# Patient Record
Sex: MALE | Race: WHITE | NOT HISPANIC OR LATINO | Employment: OTHER | URBAN - METROPOLITAN AREA
[De-identification: names, ages, dates, MRNs, and addresses within clinical notes are randomized per-mention and may not be internally consistent; named-entity substitution may affect disease eponyms.]

---

## 2022-01-21 ENCOUNTER — OFFICE VISIT (OUTPATIENT)
Dept: NEUROLOGY | Facility: MEDICAL CENTER | Age: 62
End: 2022-01-21
Attending: PSYCHIATRY & NEUROLOGY
Payer: MEDICAID

## 2022-01-21 VITALS
WEIGHT: 98.33 LBS | RESPIRATION RATE: 12 BRPM | TEMPERATURE: 98.1 F | SYSTOLIC BLOOD PRESSURE: 142 MMHG | DIASTOLIC BLOOD PRESSURE: 90 MMHG | HEIGHT: 62 IN | HEART RATE: 54 BPM | BODY MASS INDEX: 18.09 KG/M2 | OXYGEN SATURATION: 100 %

## 2022-01-21 DIAGNOSIS — R56.9 SEIZURE (HCC): Primary | ICD-10-CM

## 2022-01-21 PROCEDURE — 99205 OFFICE O/P NEW HI 60 MIN: CPT | Performed by: PSYCHIATRY & NEUROLOGY

## 2022-01-21 PROCEDURE — 99202 OFFICE O/P NEW SF 15 MIN: CPT | Performed by: PSYCHIATRY & NEUROLOGY

## 2022-01-21 RX ORDER — PREGABALIN 300 MG/1
CAPSULE ORAL
COMMUNITY
Start: 2022-01-04 | End: 2022-01-21 | Stop reason: SDUPTHER

## 2022-01-21 RX ORDER — PREGABALIN 300 MG/1
300 CAPSULE ORAL 2 TIMES DAILY
Qty: 60 CAPSULE | Refills: 5 | Status: SHIPPED | OUTPATIENT
Start: 2022-01-21 | End: 2022-03-18 | Stop reason: SDUPTHER

## 2022-01-21 RX ORDER — BUPRENORPHINE HYDROCHLORIDE, NALOXONE HYDROCHLORIDE 2; .5 MG/1; MG/1
FILM, SOLUBLE BUCCAL; SUBLINGUAL
COMMUNITY
Start: 2022-01-04 | End: 2022-10-06

## 2022-01-21 ASSESSMENT — ENCOUNTER SYMPTOMS
FALLS: 0
SEIZURES: 1
MEMORY LOSS: 0
LOSS OF CONSCIOUSNESS: 1
SENSORY CHANGE: 0
FOCAL WEAKNESS: 1

## 2022-01-21 ASSESSMENT — PATIENT HEALTH QUESTIONNAIRE - PHQ9: CLINICAL INTERPRETATION OF PHQ2 SCORE: 0

## 2022-01-22 NOTE — PROGRESS NOTES
"Aury Mckoy is a 61 y.o. male who presents from the office of Dr. Niraj Quintero DO, for consultation, with a history of seizures, also status post  shunt placement for arachnoid cyst.  He presents with his significant other, Myranda, who provides additional history and color.    DIMITRIOS Banuelos is a pleasant, though unfortunate, 61-year-old right-handed gentleman whose first seizure occurred he estimates in about 2008.  At the time he was incarcerated in the state of [place].  He remembers being in long term out on the floor, the next thing he remembers is awakening on the floor.  He evidently had been incontinent, awoke with a severe headache, was very tired and confused.  He evidently was shipped off to the [facility] but nothing specific was done for him.    Still incarcerated, about 2 years later, he had a recurrence.  At that point he was hospitalized, he evidently had some type of \"tumor\" and he was placed in a \"medically induced coma\" for 8 weeks as the tumor was \"treated\".  Following treatment, he was again placed back in federal custody, though he was sent off to [facility] \"in a wheelchair with a walker\" and told that he had to learn to walk again.  Still, he was not placed on any type of seizure medication.    His third seizure occurred 3 months after he was back in [facility].  Again there was no premonitory sign, simply what sounds like was a generalized seizure, he awoke with a typical headache, post ictal confusion, tiredness, etc.  He was finally released from custody in 2012, but the seizure events continued.    His seizures evidently were witnessed by Myranda on a more consistent pattern.  They would begin with slurred speech, he would then lose consciousness, fall to the ground if he is standing.  She describes generalized shaking and unresponsiveness, his eyes are closed.  Intermittently he can be incontinent, but he always awakens very confused, tired and complaining of headache and muscle soreness.  " "It usually takes about 24 hours with good rest, but he recovers back to baseline.  There was never a pattern to occurrence, but he was having them, they estimate, upwards of 7 times in a week.    During this time, he had continue to use THC, history of heroin and methamphetamine having stopped in the late 1990s.  He was using daily, though they felt the seizures were happening less often, he could go without 1 for maybe 1-2 months.    Clinically, since 2010 with the \"tumor\" identified, and his discharge, he walks in very unsteady fashion.  He has a real tendency to fall as a result.  He feels the legs are incoordinated, also weakened.  He denies any sensory distortions.  There is some incoordination with the upper extremities, though to a lesser degree, right slightly more involved than the left.    He finally saw Dr. Quintero, and about 4 months ago Lyrica was started, titrating slowly, he has been on a 300 mg twice daily regimen for the last several months, his last seizure occurred 3 weeks ago.  He states he is tolerating the Lyrica without issue, the drug was used simply because it can help in patients with fibromyalgia, another diagnosis he carries.    They cannot recall the last time a CT scan of the brain was obtained or an EEG being done.    Other than his seizures, he has a history of osteogenesis imperfecta, fibromyalgia, but no history of liver or kidney disease, MS, migraine, CVA, PAD, CAD, malignancy, thyroid disease, autoimmune disease, pulmonary disease, or blood dyscrasia.    He underwent  shunt placement a couple of years after he left custody in March, 2012, though they are not specific, they remember being told that he had an arachnoid cyst.    Known in the family has a history of seizures.    Social history as above, he also smokes regularly and uses cannabis regularly.  He does not drink.  He is presently on Lyrica 300 mg, twice daily, and Suboxone.    Review of Systems   Musculoskeletal: " "Negative for falls.   Neurological: Positive for focal weakness, seizures and loss of consciousness. Negative for sensory change.   Psychiatric/Behavioral: Negative for memory loss.   All other systems reviewed and are negative.    Objective     /90 (BP Location: Left arm, Patient Position: Sitting, BP Cuff Size: Adult)   Pulse (!) 54   Temp 36.7 °C (98.1 °F) (Temporal)   Resp 12   Ht 1.575 m (5' 2\")   Wt 44.6 kg (98 lb 5.2 oz)   SpO2 100%   BMI 17.98 kg/m²      Physical Exam    He appears in no acute distress, he is cooperative.  He appears older than his stated age.  He is a dentulous.  There is no malar rash.  His neck is supple.  Cardiac evaluation is unremarkable.    Neurological Exam    His mental processing speed is slowed, but he is fully oriented, there is no aphasia, agnosia, apraxia, or inattention.    PERRLA/EOMI.  Visual fields are full to finger counting on confrontation bilaterally.  He is myopic, funduscopic exam could not be performed.  There is a slight dysarthria to his speech, not easily characterized.  There is no bradykinesia or hypophonia.  Facial movements are symmetric, the tongue and uvula are midline.  Sensory exam is intact to light touch and temperature.  Shoulder shrug and head rotation are intact.    Musculoskeletal exam reveals normal tone throughout.  There is no tremor or drift.  Strength in the upper extremities is intact.  In the lower extremities there is some weakness of the hip stabilizers, and some weakness distally in the left lower extremity on dorsiflexion and plantar flexion.    Reflexes in the upper extremities are very brisk, there are no clear asymmetries.  In the lower extremities they are relatively diminished bilaterally, but present throughout except the left ankle jerk which seems to be absent.  Both toes are downgoing.    He is very unsteady as he walks, station is widened, he looks at the ground to maintain balance.  There is a clear apractic " quality.  There is incoordination with both lower extremities out of proportion to the degree of weakness.  Repetitive movements with the feet are slowed, left more than right.  In the upper extremities there is slight dystaxia with the right upper extremity, the left is intact.  Movements are slower with the right arm than left, repetitive movements are slowed bilaterally in the hands and fingers.    Sensory exam is grossly intact to vibration and temperature in the upper extremities, there is a slightly diminished perception of temperature and vibration in the left leg.    Assessment & Plan     1. Seizure (HCC)  For now, we will continue the Lyrica 300 mg, twice daily regimen, it does seem to be helping him, though the degree of control still remains to be seen.  They will keep track of his events moving forwards, and depending on need, we may need to add another antiepileptic since he is already on a pretty good dose of this drug.  All of this presumes that we are seeing true epilepsy and not a combination of epileptic and nonepileptic spells.  He certainly has risk for the former. EEG will be obtained, CT scan of the brain with and without contrast.  Need to get a better idea of just what is going on intracranially, his examination is consistent with a posterior fossa lesion, whether or not this is sequelae of a tumor resection, the presence of a concurrent arachnoid cyst, or either alone, remains to be seen.    Face-to-face time was spent with both of them talking about my impressions as well as recommendations for work-up and subsequent treatment.  We will follow-up in the next month or so, timing the EEG and imaging studies to be done on the same day as my follow-up visit to save them a long trip from where they live in [place].  They know to contact the office if he were to have a sudden cluster of seizures.      - pregabalin (LYRICA) 300 MG capsule; Take 1 Capsule by mouth 2 times a day for 181 days.   Dispense: 60 Capsule; Refill: 5  - Referral to Neurodiagnostics (EEG,EP,EMG/NCS/DBS)  - CT-HEAD WITH & W/O; Future    Time: 60 minutes in total spent on patient care including precharting, record review, discussions with healthcare staff and documentation.  This includes face-to-face time with the patient for exam, review, discussion, as well as counseling and coordinating care.

## 2022-03-11 ENCOUNTER — NON-PROVIDER VISIT (OUTPATIENT)
Dept: NEUROLOGY | Facility: MEDICAL CENTER | Age: 62
End: 2022-03-11
Attending: PSYCHIATRY & NEUROLOGY
Payer: MEDICAID

## 2022-03-11 ENCOUNTER — HOSPITAL ENCOUNTER (OUTPATIENT)
Dept: RADIOLOGY | Facility: MEDICAL CENTER | Age: 62
End: 2022-03-11
Attending: PSYCHIATRY & NEUROLOGY
Payer: MEDICAID

## 2022-03-11 DIAGNOSIS — R56.9 SEIZURE (HCC): ICD-10-CM

## 2022-03-11 PROCEDURE — 700117 HCHG RX CONTRAST REV CODE 255: Performed by: PSYCHIATRY & NEUROLOGY

## 2022-03-11 PROCEDURE — 95819 EEG AWAKE AND ASLEEP: CPT | Performed by: PSYCHIATRY & NEUROLOGY

## 2022-03-11 PROCEDURE — 70470 CT HEAD/BRAIN W/O & W/DYE: CPT

## 2022-03-11 RX ADMIN — IOHEXOL 50 ML: 350 INJECTION, SOLUTION INTRAVENOUS at 14:58

## 2022-03-11 NOTE — PROCEDURES
ROUTINE ELECTROENCEPHALOGRAM REPORT      Referring provider: Dr. Niraj Quintero DO    DOS: March 11, 2022 of 30-minute duration    INDICATION:  Vin Mckoy 61 y.o. male presenting with a history of presumed symptomatic seizures related to  shunt placement, EEG now requested as a baseline study to rule out continuing seizure activity.  Imaging of the brain with CT scan revealed no evidence of previous shunt or significant mass.    CURRENT ANTIEPILEPTIC REGIMEN: Vimpat 300 mg, twice daily    TECHNIQUE: 30 channel routine electroencephalogram (EEG) was performed in accordance with the international 10-20 system. The study was reviewed in bipolar and referential montages. The recording examined the patient during wakeful and drowsy/sleep state(s).     DESCRIPTION OF THE RECORD:  During the wakefulness, the background showed a symmetrical 9-10 hz alpha activity posteriorly with amplitude of ~70 mV.  There was reactivity to eye closure/opening.  A normal anterior-posterior gradient was noted with faster beta frequencies seen anteriorly.  During drowsiness, theta/delta frequencies were seen.    During the sleep state, background shows diffuse high-amplitude 4-5 Hz delta activity.  Symmetrical high-amplitude sleep spindles and vertex sharps were seen in the leads over the central regions.     ACTIVATION PROCEDURES:   Hyperventilation was not performed.    Intermittent Photic stimulation was performed in a stepwise fashion from 1 to 30 Hz and elicited only a slight driving response posteriorly.    ICTAL AND/OR INTERICTAL FINDINGS:   Throughout the tracing, a pattern of irregular slowing can be seen, at times rhythmic and sharply contoured over the left temporal and frontal regions, and occasional phase reversal is seen at the F7 electrode.  Intermittently this occurs over the right temporal lobe as well, at times synchronously.  The slowing can last upwards of 10 seconds.  There is no suppression that follows.  A  pattern of bilateral, more generalized slowing is also seen.  No clear paroxysmal activity is seen.    EKG: sampling of the EKG recording demonstrated sinus rhythm.     INTERPRETATION:  This is an abnormal routine EEG recording in the awake and mostly drowsy/sleep state(s).  Findings are consistent with bilateral, left greater than right sided, cerebral dysfunction with focality of the left frontal and anterior temporal regions.  The underlying abnormality suggests susceptibility to seizures though no clear seizure activity is seen.    Note: A normal EEG does not rule out epilepsy.  If the clinical suspicion remains high for seizures, a prolonged recording to capture clinical or subclinical events may be helpful.        Yousuf Hernández M.D.

## 2022-03-18 ENCOUNTER — OFFICE VISIT (OUTPATIENT)
Dept: NEUROLOGY | Facility: MEDICAL CENTER | Age: 62
End: 2022-03-18
Attending: PSYCHIATRY & NEUROLOGY
Payer: MEDICAID

## 2022-03-18 VITALS
HEIGHT: 60 IN | DIASTOLIC BLOOD PRESSURE: 68 MMHG | SYSTOLIC BLOOD PRESSURE: 124 MMHG | WEIGHT: 96.34 LBS | HEART RATE: 58 BPM | BODY MASS INDEX: 18.91 KG/M2 | OXYGEN SATURATION: 100 % | TEMPERATURE: 97.4 F

## 2022-03-18 DIAGNOSIS — R56.9 SEIZURE (HCC): Primary | ICD-10-CM

## 2022-03-18 PROCEDURE — 99211 OFF/OP EST MAY X REQ PHY/QHP: CPT | Performed by: PSYCHIATRY & NEUROLOGY

## 2022-03-18 PROCEDURE — 99213 OFFICE O/P EST LOW 20 MIN: CPT | Performed by: PSYCHIATRY & NEUROLOGY

## 2022-03-18 RX ORDER — LIDOCAINE 36 MG/1
PATCH TOPICAL
COMMUNITY
Start: 2022-03-06 | End: 2022-10-06

## 2022-03-18 RX ORDER — PREGABALIN 300 MG/1
300 CAPSULE ORAL 2 TIMES DAILY
Qty: 180 CAPSULE | Refills: 3 | Status: SHIPPED | OUTPATIENT
Start: 2022-03-18 | End: 2023-03-13

## 2022-03-18 ASSESSMENT — ENCOUNTER SYMPTOMS
SEIZURES: 0
LOSS OF CONSCIOUSNESS: 0
MEMORY LOSS: 0

## 2022-03-18 NOTE — PROGRESS NOTES
Subjective     Vin Mckoy is a 61 y.o. male who presents for follow-up, with a history of presumed symptomatic seizures related to  shunt placement for some type of posterior fossa cyst, now for follow-up after CT scan of the brain with and without contrast and EEG study.    HPI    Since last seen, and he states that he has had no seizures.  He has been compliant with Lyrica 300 mg, twice daily.  Though his EEG study did not show continuous nonconvulsive seizure activity, clearly showed rhythmic and occasional paroxysmal dysfunction over both temporal lobes, especially the left.  CT scan of the brain was remarkably completely normal.  There was no evidence of any type of posterior fossa lesion, no evidence of shunt catheter.    Medical, surgical and family histories are reviewed, there are no new drug allergies.  The balance difficulties he has had after his surgery remains unchanged.  He is on Lyrica 300 mg, twice daily, and Suboxone.    Review of Systems   Neurological: Negative for seizures and loss of consciousness.   Psychiatric/Behavioral: Negative for memory loss.   All other systems reviewed and are negative.    Objective     /68 (BP Location: Right arm, Patient Position: Sitting, BP Cuff Size: Adult)   Pulse (!) 58   Temp 36.3 °C (97.4 °F) (Temporal)   Ht 1.524 m (5')   Wt 43.7 kg (96 lb 5.5 oz)   SpO2 100%   BMI 18.82 kg/m²      Physical Exam    He appears no acute distress.  Vital signs are stable.  There is no malar rash.  His neck is supple.  Cardiac evaluation is unremarkable.     Neurological Exam    Fully oriented, there is no aphasia or inattention.  PERRLA/EOMI.  Visual fields are grossly full.  Facial movements are symmetric, sensory exam is intact to light touch.  There is slight dysarthria.  Shoulder shrug and head rotation are normal.    Musculoskeletal exam reveals no obvious weakness.  Strength is intact in all 4 extremities.    Postural instability with widened station  and axial instability are unchanged.  There is no appendicular dystaxia.  Repetitive movements are slowed but symmetric.    Assessment & Plan     1. Seizure (HCC)  We will continue Lyrica 300 mg, twice daily.  He tolerates the drug well, he was told his EEG shows susceptibility for seizures, suggesting he will likely need medication for quite a while, if not lifelong.  We will follow-up in 1 year.    - pregabalin (LYRICA) 300 MG capsule; Take 1 Capsule by mouth 2 times a day for 360 days.  Dispense: 180 Capsule; Refill: 3    Time: 20 minutes in total spent on patient care including precharting, record review, discussions with healthcare staff and documentation.  This includes face-to-face time with the patient for exam, review, discussion, as well as counseling and coordinating care.

## 2022-05-26 ENCOUNTER — TELEPHONE (OUTPATIENT)
Dept: NEUROLOGY | Facility: MEDICAL CENTER | Age: 62
End: 2022-05-26
Payer: MEDICAID

## 2022-05-26 DIAGNOSIS — R56.9 SEIZURE (HCC): ICD-10-CM

## 2022-05-27 NOTE — TELEPHONE ENCOUNTER
----- Message from Lyudmila Reza, [facility] sent at 5/25/2022  1:18 PM PDT -----  Regarding: Seizures  Good afternoon,  This Pts wife Myranda called saying he is having a lot of seizures still and asks if they should increase the Lyrica? Or what they should do.  Lyudmila Reza, MA

## 2022-06-01 ENCOUNTER — APPOINTMENT (OUTPATIENT)
Dept: NEUROLOGY | Facility: MEDICAL CENTER | Age: 62
End: 2022-06-01
Attending: REGISTERED NURSE
Payer: MEDICAID

## 2022-09-30 ENCOUNTER — TELEPHONE (OUTPATIENT)
Dept: NEUROLOGY | Facility: MEDICAL CENTER | Age: 62
End: 2022-09-30
Payer: MEDICAID

## 2022-09-30 NOTE — TELEPHONE ENCOUNTER
Phone Number Called: [phone]    Call outcome:  Was not able to leave message and received error code not able to take your call at this time and was not able to leave a message on phone number provided    Message: Reason I called was to verify that  patient received and  his medication yesterday at the pharmacy. Was advise by pharmacy medication was picked up on 09/29/2022.    MORRIS Gonzales

## 2022-10-06 ENCOUNTER — HOSPITAL ENCOUNTER (INPATIENT)
Facility: MEDICAL CENTER | Age: 62
LOS: 2 days | DRG: 083 | End: 2022-10-08
Attending: EMERGENCY MEDICINE | Admitting: SURGERY
Payer: MEDICAID

## 2022-10-06 ENCOUNTER — HOSPITAL ENCOUNTER (OUTPATIENT)
Dept: RADIOLOGY | Facility: MEDICAL CENTER | Age: 62
End: 2022-10-06

## 2022-10-06 ENCOUNTER — APPOINTMENT (OUTPATIENT)
Dept: RADIOLOGY | Facility: MEDICAL CENTER | Age: 62
DRG: 083 | End: 2022-10-06
Attending: EMERGENCY MEDICINE
Payer: MEDICAID

## 2022-10-06 ENCOUNTER — APPOINTMENT (OUTPATIENT)
Dept: RADIOLOGY | Facility: MEDICAL CENTER | Age: 62
DRG: 083 | End: 2022-10-06
Payer: MEDICAID

## 2022-10-06 DIAGNOSIS — S06.5XAA SUBDURAL HEMATOMA (HCC): ICD-10-CM

## 2022-10-06 PROBLEM — T14.90XA TRAUMA: Status: ACTIVE | Noted: 2022-10-06

## 2022-10-06 PROBLEM — Z53.09 CONTRAINDICATION TO DEEP VEIN THROMBOSIS (DVT) PROPHYLAXIS: Status: ACTIVE | Noted: 2022-10-06

## 2022-10-06 PROBLEM — Z87.898 HISTORY OF SEIZURES: Status: ACTIVE | Noted: 2022-10-06

## 2022-10-06 LAB
ABO GROUP BLD: NORMAL
ALBUMIN SERPL BCP-MCNC: 3.5 G/DL (ref 3.2–4.9)
ALBUMIN/GLOB SERPL: 1.1 G/DL
ALP SERPL-CCNC: 58 U/L (ref 30–99)
ALT SERPL-CCNC: 16 U/L (ref 2–50)
ANION GAP SERPL CALC-SCNC: 9 MMOL/L (ref 7–16)
APTT PPP: 35.5 SEC (ref 24.7–36)
AST SERPL-CCNC: 18 U/L (ref 12–45)
BILIRUB SERPL-MCNC: 0.4 MG/DL (ref 0.1–1.5)
BLD GP AB SCN SERPL QL: NORMAL
BUN SERPL-MCNC: 11 MG/DL (ref 8–22)
CALCIUM SERPL-MCNC: 8.8 MG/DL (ref 8.5–10.5)
CFT BLD TEG: 7.9 MIN (ref 4.6–9.1)
CFT P HPASE BLD TEG: 7.8 MIN (ref 4.3–8.3)
CHLORIDE SERPL-SCNC: 107 MMOL/L (ref 96–112)
CLOT ANGLE BLD TEG: 64.5 DEGREES (ref 63–78)
CLOT LYSIS 30M P MA LENFR BLD TEG: 0.1 % (ref 0–2.6)
CO2 SERPL-SCNC: 25 MMOL/L (ref 20–33)
CREAT SERPL-MCNC: 0.51 MG/DL (ref 0.5–1.4)
CT.EXTRINSIC BLD ROTEM: 1.9 MIN (ref 0.8–2.1)
ERYTHROCYTE [DISTWIDTH] IN BLOOD BY AUTOMATED COUNT: 48.3 FL (ref 35.9–50)
ETHANOL BLD-MCNC: <10.1 MG/DL
GFR SERPLBLD CREATININE-BSD FMLA CKD-EPI: 116 ML/MIN/1.73 M 2
GLOBULIN SER CALC-MCNC: 3.3 G/DL (ref 1.9–3.5)
GLUCOSE SERPL-MCNC: 99 MG/DL (ref 65–99)
HCT VFR BLD AUTO: 41.4 % (ref 42–52)
HGB BLD-MCNC: 13.9 G/DL (ref 14–18)
INR PPP: 1.13 (ref 0.87–1.13)
MCF BLD TEG: 58.8 MM (ref 52–69)
MCF.PLATELET INHIB BLD ROTEM: 21.2 MM (ref 15–32)
MCH RBC QN AUTO: 30.6 PG (ref 27–33)
MCHC RBC AUTO-ENTMCNC: 33.6 G/DL (ref 33.7–35.3)
MCV RBC AUTO: 91.2 FL (ref 81.4–97.8)
PA AA BLD-ACNC: 84.9 % (ref 0–11)
PA ADP BLD-ACNC: 54.6 % (ref 0–17)
PLATELET # BLD AUTO: 246 K/UL (ref 164–446)
PMV BLD AUTO: 10.8 FL (ref 9–12.9)
POTASSIUM SERPL-SCNC: 3.5 MMOL/L (ref 3.6–5.5)
PROT SERPL-MCNC: 6.8 G/DL (ref 6–8.2)
PROTHROMBIN TIME: 14.4 SEC (ref 12–14.6)
RBC # BLD AUTO: 4.54 M/UL (ref 4.7–6.1)
RH BLD: NORMAL
SODIUM SERPL-SCNC: 141 MMOL/L (ref 135–145)
TEG ALGORITHM TGALG: ABNORMAL
WBC # BLD AUTO: 9.4 K/UL (ref 4.8–10.8)

## 2022-10-06 PROCEDURE — 85347 COAGULATION TIME ACTIVATED: CPT

## 2022-10-06 PROCEDURE — 85384 FIBRINOGEN ACTIVITY: CPT

## 2022-10-06 PROCEDURE — 86850 RBC ANTIBODY SCREEN: CPT

## 2022-10-06 PROCEDURE — 99291 CRITICAL CARE FIRST HOUR: CPT | Performed by: SURGERY

## 2022-10-06 PROCEDURE — 86901 BLOOD TYPING SEROLOGIC RH(D): CPT

## 2022-10-06 PROCEDURE — 80053 COMPREHEN METABOLIC PANEL: CPT

## 2022-10-06 PROCEDURE — 86900 BLOOD TYPING SEROLOGIC ABO: CPT

## 2022-10-06 PROCEDURE — 85610 PROTHROMBIN TIME: CPT

## 2022-10-06 PROCEDURE — 99291 CRITICAL CARE FIRST HOUR: CPT

## 2022-10-06 PROCEDURE — 85576 BLOOD PLATELET AGGREGATION: CPT | Mod: 91

## 2022-10-06 PROCEDURE — 85730 THROMBOPLASTIN TIME PARTIAL: CPT

## 2022-10-06 PROCEDURE — 85027 COMPLETE CBC AUTOMATED: CPT

## 2022-10-06 PROCEDURE — 770022 HCHG ROOM/CARE - ICU (200)

## 2022-10-06 PROCEDURE — 36415 COLL VENOUS BLD VENIPUNCTURE: CPT

## 2022-10-06 PROCEDURE — 82077 ASSAY SPEC XCP UR&BREATH IA: CPT

## 2022-10-06 PROCEDURE — G0390 TRAUMA RESPONS W/HOSP CRITI: HCPCS

## 2022-10-06 RX ORDER — SULFAMETHOXAZOLE AND TRIMETHOPRIM 800; 160 MG/1; MG/1
TABLET ORAL
Status: ON HOLD | COMMUNITY
End: 2022-10-07

## 2022-10-06 RX ORDER — AMOXICILLIN 250 MG
1 CAPSULE ORAL
Status: DISCONTINUED | OUTPATIENT
Start: 2022-10-06 | End: 2022-10-08 | Stop reason: HOSPADM

## 2022-10-06 RX ORDER — ONDANSETRON 2 MG/ML
4 INJECTION INTRAMUSCULAR; INTRAVENOUS EVERY 4 HOURS PRN
Status: DISCONTINUED | OUTPATIENT
Start: 2022-10-06 | End: 2022-10-08 | Stop reason: HOSPADM

## 2022-10-06 RX ORDER — SODIUM CHLORIDE 9 MG/ML
INJECTION, SOLUTION INTRAVENOUS CONTINUOUS
Status: DISCONTINUED | OUTPATIENT
Start: 2022-10-07 | End: 2022-10-07

## 2022-10-06 RX ORDER — ACETAMINOPHEN 325 MG/1
650 TABLET ORAL EVERY 6 HOURS
Status: DISCONTINUED | OUTPATIENT
Start: 2022-10-07 | End: 2022-10-08 | Stop reason: HOSPADM

## 2022-10-06 RX ORDER — BISACODYL 10 MG
10 SUPPOSITORY, RECTAL RECTAL
Status: DISCONTINUED | OUTPATIENT
Start: 2022-10-06 | End: 2022-10-08 | Stop reason: HOSPADM

## 2022-10-06 RX ORDER — ONDANSETRON 4 MG/1
4 TABLET, ORALLY DISINTEGRATING ORAL EVERY 4 HOURS PRN
Status: DISCONTINUED | OUTPATIENT
Start: 2022-10-06 | End: 2022-10-08 | Stop reason: HOSPADM

## 2022-10-06 RX ORDER — ACETAMINOPHEN 325 MG/1
650 TABLET ORAL
Status: ON HOLD | COMMUNITY
End: 2022-10-08

## 2022-10-06 RX ORDER — FAMOTIDINE 20 MG/1
20 TABLET, FILM COATED ORAL 2 TIMES DAILY
Status: DISCONTINUED | OUTPATIENT
Start: 2022-10-07 | End: 2022-10-07

## 2022-10-06 RX ORDER — ENEMA 19; 7 G/133ML; G/133ML
1 ENEMA RECTAL
Status: DISCONTINUED | OUTPATIENT
Start: 2022-10-06 | End: 2022-10-08 | Stop reason: HOSPADM

## 2022-10-06 RX ORDER — POLYETHYLENE GLYCOL 3350 17 G/17G
1 POWDER, FOR SOLUTION ORAL 2 TIMES DAILY
Status: DISCONTINUED | OUTPATIENT
Start: 2022-10-07 | End: 2022-10-08 | Stop reason: HOSPADM

## 2022-10-06 RX ORDER — DOCUSATE SODIUM 100 MG/1
100 CAPSULE, LIQUID FILLED ORAL 2 TIMES DAILY
Status: DISCONTINUED | OUTPATIENT
Start: 2022-10-07 | End: 2022-10-08 | Stop reason: HOSPADM

## 2022-10-06 RX ORDER — ACETAMINOPHEN 325 MG/1
650 TABLET ORAL EVERY 6 HOURS PRN
Status: DISCONTINUED | OUTPATIENT
Start: 2022-10-12 | End: 2022-10-08 | Stop reason: HOSPADM

## 2022-10-06 RX ORDER — AMOXICILLIN 250 MG
1 CAPSULE ORAL NIGHTLY
Status: DISCONTINUED | OUTPATIENT
Start: 2022-10-07 | End: 2022-10-08 | Stop reason: HOSPADM

## 2022-10-07 ENCOUNTER — HOSPITAL ENCOUNTER (OUTPATIENT)
Dept: RADIOLOGY | Facility: MEDICAL CENTER | Age: 62
End: 2022-10-07
Attending: SURGERY
Payer: MEDICAID

## 2022-10-07 ENCOUNTER — APPOINTMENT (OUTPATIENT)
Dept: RADIOLOGY | Facility: MEDICAL CENTER | Age: 62
DRG: 083 | End: 2022-10-07
Attending: SURGERY
Payer: MEDICAID

## 2022-10-07 ENCOUNTER — APPOINTMENT (OUTPATIENT)
Dept: RADIOLOGY | Facility: MEDICAL CENTER | Age: 62
DRG: 083 | End: 2022-10-07
Attending: NEUROLOGICAL SURGERY
Payer: MEDICAID

## 2022-10-07 LAB
ABO + RH BLD: NORMAL
ALBUMIN SERPL BCP-MCNC: 3.3 G/DL (ref 3.2–4.9)
ALBUMIN/GLOB SERPL: 1 G/DL
ALP SERPL-CCNC: 59 U/L (ref 30–99)
ALT SERPL-CCNC: 17 U/L (ref 2–50)
ANION GAP SERPL CALC-SCNC: 10 MMOL/L (ref 7–16)
AST SERPL-CCNC: 15 U/L (ref 12–45)
BASOPHILS # BLD AUTO: 1.1 % (ref 0–1.8)
BASOPHILS # BLD: 0.07 K/UL (ref 0–0.12)
BILIRUB SERPL-MCNC: 0.5 MG/DL (ref 0.1–1.5)
BUN SERPL-MCNC: 9 MG/DL (ref 8–22)
CALCIUM SERPL-MCNC: 8.8 MG/DL (ref 8.5–10.5)
CHLORIDE SERPL-SCNC: 109 MMOL/L (ref 96–112)
CO2 SERPL-SCNC: 22 MMOL/L (ref 20–33)
CREAT SERPL-MCNC: 0.4 MG/DL (ref 0.5–1.4)
EOSINOPHIL # BLD AUTO: 0.32 K/UL (ref 0–0.51)
EOSINOPHIL NFR BLD: 5.1 % (ref 0–6.9)
ERYTHROCYTE [DISTWIDTH] IN BLOOD BY AUTOMATED COUNT: 47.4 FL (ref 35.9–50)
GFR SERPLBLD CREATININE-BSD FMLA CKD-EPI: 125 ML/MIN/1.73 M 2
GLOBULIN SER CALC-MCNC: 3.4 G/DL (ref 1.9–3.5)
GLUCOSE SERPL-MCNC: 96 MG/DL (ref 65–99)
HCT VFR BLD AUTO: 43.2 % (ref 42–52)
HGB BLD-MCNC: 14.6 G/DL (ref 14–18)
IMM GRANULOCYTES # BLD AUTO: 0.02 K/UL (ref 0–0.11)
IMM GRANULOCYTES NFR BLD AUTO: 0.3 % (ref 0–0.9)
LYMPHOCYTES # BLD AUTO: 2.06 K/UL (ref 1–4.8)
LYMPHOCYTES NFR BLD: 32.7 % (ref 22–41)
MAGNESIUM SERPL-MCNC: 1.8 MG/DL (ref 1.5–2.5)
MCH RBC QN AUTO: 30.2 PG (ref 27–33)
MCHC RBC AUTO-ENTMCNC: 33.8 G/DL (ref 33.7–35.3)
MCV RBC AUTO: 89.4 FL (ref 81.4–97.8)
MONOCYTES # BLD AUTO: 0.41 K/UL (ref 0–0.85)
MONOCYTES NFR BLD AUTO: 6.5 % (ref 0–13.4)
NEUTROPHILS # BLD AUTO: 3.42 K/UL (ref 1.82–7.42)
NEUTROPHILS NFR BLD: 54.3 % (ref 44–72)
NRBC # BLD AUTO: 0 K/UL
NRBC BLD-RTO: 0 /100 WBC
PHOSPHATE SERPL-MCNC: 2.2 MG/DL (ref 2.5–4.5)
PLATELET # BLD AUTO: 234 K/UL (ref 164–446)
PMV BLD AUTO: 11.2 FL (ref 9–12.9)
POTASSIUM SERPL-SCNC: 3.7 MMOL/L (ref 3.6–5.5)
PROT SERPL-MCNC: 6.7 G/DL (ref 6–8.2)
RBC # BLD AUTO: 4.83 M/UL (ref 4.7–6.1)
SODIUM SERPL-SCNC: 141 MMOL/L (ref 135–145)
WBC # BLD AUTO: 6.3 K/UL (ref 4.8–10.8)

## 2022-10-07 PROCEDURE — 70496 CT ANGIOGRAPHY HEAD: CPT

## 2022-10-07 PROCEDURE — 70450 CT HEAD/BRAIN W/O DYE: CPT

## 2022-10-07 PROCEDURE — 71045 X-RAY EXAM CHEST 1 VIEW: CPT

## 2022-10-07 PROCEDURE — 93970 EXTREMITY STUDY: CPT | Mod: 26,GZ | Performed by: INTERNAL MEDICINE

## 2022-10-07 PROCEDURE — 700117 HCHG RX CONTRAST REV CODE 255: Performed by: NEUROLOGICAL SURGERY

## 2022-10-07 PROCEDURE — 700102 HCHG RX REV CODE 250 W/ 637 OVERRIDE(OP): Performed by: NURSE PRACTITIONER

## 2022-10-07 PROCEDURE — 700105 HCHG RX REV CODE 258: Performed by: SURGERY

## 2022-10-07 PROCEDURE — 80053 COMPREHEN METABOLIC PANEL: CPT

## 2022-10-07 PROCEDURE — 700111 HCHG RX REV CODE 636 W/ 250 OVERRIDE (IP): Performed by: SURGERY

## 2022-10-07 PROCEDURE — 700102 HCHG RX REV CODE 250 W/ 637 OVERRIDE(OP): Performed by: SURGERY

## 2022-10-07 PROCEDURE — 83735 ASSAY OF MAGNESIUM: CPT

## 2022-10-07 PROCEDURE — 99233 SBSQ HOSP IP/OBS HIGH 50: CPT | Performed by: NURSE PRACTITIONER

## 2022-10-07 PROCEDURE — 770001 HCHG ROOM/CARE - MED/SURG/GYN PRIV*

## 2022-10-07 PROCEDURE — 700101 HCHG RX REV CODE 250: Performed by: SURGERY

## 2022-10-07 PROCEDURE — 97162 PT EVAL MOD COMPLEX 30 MIN: CPT

## 2022-10-07 PROCEDURE — 93970 EXTREMITY STUDY: CPT

## 2022-10-07 PROCEDURE — A9270 NON-COVERED ITEM OR SERVICE: HCPCS | Performed by: NURSE PRACTITIONER

## 2022-10-07 PROCEDURE — A9270 NON-COVERED ITEM OR SERVICE: HCPCS | Performed by: SURGERY

## 2022-10-07 PROCEDURE — 84100 ASSAY OF PHOSPHORUS: CPT

## 2022-10-07 PROCEDURE — 85025 COMPLETE CBC W/AUTO DIFF WBC: CPT

## 2022-10-07 PROCEDURE — 97165 OT EVAL LOW COMPLEX 30 MIN: CPT

## 2022-10-07 RX ORDER — HYDRALAZINE HYDROCHLORIDE 20 MG/ML
10 INJECTION INTRAMUSCULAR; INTRAVENOUS EVERY 6 HOURS PRN
Status: DISCONTINUED | OUTPATIENT
Start: 2022-10-07 | End: 2022-10-08 | Stop reason: HOSPADM

## 2022-10-07 RX ORDER — BUTALBITAL, ACETAMINOPHEN AND CAFFEINE 50; 325; 40 MG/1; MG/1; MG/1
1 TABLET ORAL EVERY 4 HOURS PRN
Status: DISCONTINUED | OUTPATIENT
Start: 2022-10-07 | End: 2022-10-08 | Stop reason: HOSPADM

## 2022-10-07 RX ORDER — MAGNESIUM SULFATE HEPTAHYDRATE 40 MG/ML
2 INJECTION, SOLUTION INTRAVENOUS ONCE
Status: COMPLETED | OUTPATIENT
Start: 2022-10-07 | End: 2022-10-07

## 2022-10-07 RX ORDER — DIPHENHYDRAMINE HYDROCHLORIDE 50 MG/ML
50 INJECTION INTRAMUSCULAR; INTRAVENOUS ONCE
Status: DISCONTINUED | OUTPATIENT
Start: 2022-10-07 | End: 2022-10-07 | Stop reason: CLARIF

## 2022-10-07 RX ORDER — PREGABALIN 150 MG/1
300 CAPSULE ORAL 2 TIMES DAILY
Status: DISCONTINUED | OUTPATIENT
Start: 2022-10-07 | End: 2022-10-08 | Stop reason: HOSPADM

## 2022-10-07 RX ADMIN — ACETAMINOPHEN 650 MG: 325 TABLET, FILM COATED ORAL at 12:06

## 2022-10-07 RX ADMIN — MAGNESIUM SULFATE HEPTAHYDRATE 2 G: 40 INJECTION, SOLUTION INTRAVENOUS at 08:03

## 2022-10-07 RX ADMIN — PREGABALIN 300 MG: 150 CAPSULE ORAL at 17:54

## 2022-10-07 RX ADMIN — IOHEXOL 80 ML: 350 INJECTION, SOLUTION INTRAVENOUS at 10:49

## 2022-10-07 RX ADMIN — ACETAMINOPHEN 650 MG: 325 TABLET, FILM COATED ORAL at 00:50

## 2022-10-07 RX ADMIN — PREGABALIN 300 MG: 150 CAPSULE ORAL at 06:07

## 2022-10-07 RX ADMIN — SENNOSIDES AND DOCUSATE SODIUM 1 TABLET: 50; 8.6 TABLET ORAL at 00:51

## 2022-10-07 RX ADMIN — POTASSIUM PHOSPHATE, MONOBASIC AND POTASSIUM PHOSPHATE, DIBASIC 30 MMOL: 224; 236 INJECTION, SOLUTION, CONCENTRATE INTRAVENOUS at 10:56

## 2022-10-07 RX ADMIN — DOCUSATE SODIUM 100 MG: 100 CAPSULE, LIQUID FILLED ORAL at 17:54

## 2022-10-07 RX ADMIN — SENNOSIDES AND DOCUSATE SODIUM 1 TABLET: 50; 8.6 TABLET ORAL at 20:00

## 2022-10-07 RX ADMIN — ACETAMINOPHEN 650 MG: 325 TABLET, FILM COATED ORAL at 06:08

## 2022-10-07 RX ADMIN — HYDRALAZINE HYDROCHLORIDE 10 MG: 20 INJECTION INTRAMUSCULAR; INTRAVENOUS at 09:02

## 2022-10-07 RX ADMIN — DOCUSATE SODIUM 100 MG: 100 CAPSULE, LIQUID FILLED ORAL at 06:08

## 2022-10-07 RX ADMIN — FAMOTIDINE 20 MG: 20 TABLET, FILM COATED ORAL at 06:08

## 2022-10-07 RX ADMIN — SODIUM CHLORIDE: 9 INJECTION, SOLUTION INTRAVENOUS at 00:51

## 2022-10-07 RX ADMIN — POLYETHYLENE GLYCOL 3350 1 PACKET: 17 POWDER, FOR SOLUTION ORAL at 17:54

## 2022-10-07 RX ADMIN — ACETAMINOPHEN 650 MG: 325 TABLET, FILM COATED ORAL at 17:54

## 2022-10-07 ASSESSMENT — PAIN DESCRIPTION - PAIN TYPE
TYPE: ACUTE PAIN

## 2022-10-07 ASSESSMENT — ACTIVITIES OF DAILY LIVING (ADL): TOILETING: INDEPENDENT

## 2022-10-07 ASSESSMENT — COGNITIVE AND FUNCTIONAL STATUS - GENERAL
WALKING IN HOSPITAL ROOM: A LITTLE
MOBILITY SCORE: 21
STANDING UP FROM CHAIR USING ARMS: A LITTLE
SUGGESTED CMS G CODE MODIFIER MOBILITY: CJ
SUGGESTED CMS G CODE MODIFIER DAILY ACTIVITY: CH
DAILY ACTIVITIY SCORE: 24
CLIMB 3 TO 5 STEPS WITH RAILING: A LITTLE

## 2022-10-07 ASSESSMENT — PATIENT HEALTH QUESTIONNAIRE - PHQ9
SUM OF ALL RESPONSES TO PHQ9 QUESTIONS 1 AND 2: 0
2. FEELING DOWN, DEPRESSED, IRRITABLE, OR HOPELESS: NOT AT ALL
1. LITTLE INTEREST OR PLEASURE IN DOING THINGS: NOT AT ALL

## 2022-10-07 ASSESSMENT — ENCOUNTER SYMPTOMS
HEADACHES: 1
RESPIRATORY NEGATIVE: 1
MYALGIAS: 1
PSYCHIATRIC NEGATIVE: 1

## 2022-10-07 ASSESSMENT — LIFESTYLE VARIABLES
EVER HAD A DRINK FIRST THING IN THE MORNING TO STEADY YOUR NERVES TO GET RID OF A HANGOVER: NO
TOTAL SCORE: 0
HAVE PEOPLE ANNOYED YOU BY CRITICIZING YOUR DRINKING: NO
HAVE YOU EVER FELT YOU SHOULD CUT DOWN ON YOUR DRINKING: NO
AVERAGE NUMBER OF DAYS PER WEEK YOU HAVE A DRINK CONTAINING ALCOHOL: 0
CONSUMPTION TOTAL: NEGATIVE
TOTAL SCORE: 0
EVER FELT BAD OR GUILTY ABOUT YOUR DRINKING: NO
ON A TYPICAL DAY WHEN YOU DRINK ALCOHOL HOW MANY DRINKS DO YOU HAVE: 0
ALCOHOL_USE: NO
TOTAL SCORE: 0
HOW MANY TIMES IN THE PAST YEAR HAVE YOU HAD 5 OR MORE DRINKS IN A DAY: 0

## 2022-10-07 ASSESSMENT — GAIT ASSESSMENTS
DEVIATION: ATAXIC
ASSISTIVE DEVICE: SINGLE POINT CANE
DISTANCE (FEET): 200
GAIT LEVEL OF ASSIST: STANDBY ASSIST

## 2022-10-07 ASSESSMENT — FIBROSIS 4 INDEX: FIB4 SCORE: 1.1
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
## 2022-10-07 NOTE — PROGRESS NOTES
Patient arrived to S[facility] with RN transport at 0018. Patient able to stand from rney to transfer to bed.     HR 61  /77  SpO2 97% on room air  RR 16  Temp 97.4f  Weight 41.5kg    Belongings: Sweats, hat, wallet ($2 cash), lighter and pipe, paper notebook, shirt, boots, wooden walking cane.       4 Eyes Skin Assessment Completed by THADDEUS Ring and THADDEUS Do.    Head WDL  Ears Redness and Blanching top of left ear  Nose WDL  Mouth WDL  Neck WDL  Breast/Chest WDL  Shoulder Blades WDL  Spine WDL  (R) Arm/Elbow/Hand Scab middle finger and forearm  (L) Arm/Elbow/Hand Redness and Blanching left elbow  Abdomen WDL  Groin WDL  Scrotum/Coccyx/Buttocks Redness and Blanching  (R) Leg Redness and Blanching small scratches thighs  (L) Leg Redness and Scar small scratches bilateral thighs, left shin scar  (R) Heel/Foot/Toe WDL calloused  (L) Heel/Foot/Toe WDL calloused        Devices In Places ECG, Blood Pressure Cuff, Pulse Ox, and SCD's      Interventions In Place Sacral Mepilex, Pillows, Q2 Turns, Low Air Loss Mattress, and Heels Loaded W/Pillows    Possible Skin Injury No    Pictures Uploaded Into Epic N/A  Wound Consult Placed N/A  RN Wound Prevention Protocol Ordered No

## 2022-10-07 NOTE — CARE PLAN
The patient is Watcher - Medium risk of patient condition declining or worsening         Progress made toward(s) clinical / shift goals:    Problem: Hemodynamics  Goal: Patient's hemodynamics, fluid balance and neurologic status will be stable or improve  Outcome: Progressing     Problem: Risk for Aspiration  Goal: Patient's risk for aspiration will be absent or decrease  Outcome: Progressing     Problem: Urinary - Renal Perfusion  Goal: Ability to achieve and maintain adequate renal perfusion and functioning will improve  Outcome: Progressing     Problem: Urinary Elimination  Goal: Establish and maintain regular urinary output  Outcome: Progressing       Patient is not progressing towards the following goals:

## 2022-10-07 NOTE — ED NOTES
Pt bedside report given to Lizy TRAVIS. Pt will be taken to the floor at this time. Care assumed by receiving nurse at this time.

## 2022-10-07 NOTE — ASSESSMENT & PLAN NOTE
Prophylactic anticoagulation for thrombotic prevention initially contraindicated secondary to elevated bleeding risk.  10/8 Trauma surveillance venous duplex scanning ordered.

## 2022-10-07 NOTE — CARE PLAN
Problem: Knowledge Deficit - Standard  Goal: Patient and family/care givers will demonstrate understanding of plan of care, disease process/condition, diagnostic tests and medications  Outcome: Progressing     Problem: Pain - Standard  Goal: Alleviation of pain or a reduction in pain to the patient’s comfort goal  Outcome: Progressing   The patient is Stable - Low risk of patient condition declining or worsening         Progress made toward(s) clinical / shift goals:  Patient able to report pain using verbal scale. Patient understands and agrees to plan of care.    Patient is not progressing towards the following goals:

## 2022-10-07 NOTE — ED NOTES
Med Rec Completed historically  No antibiotics within the last 30 days  Patient's Preferred Pharmacy: [facility] in [place], [place]        Patient's home pharmacy did not have any recent prescriptions (within the last 30 days) that had been picked up by the patient.
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
## 2022-10-07 NOTE — ASSESSMENT & PLAN NOTE
2 cm x 1 cm subdural hematoma below tentorium on left  Non-operative management.   CTA unremarkable.  Post traumatic pharmacologic seizure prophylaxis for 1 week not required.  Speech Language Pathology cognitive evaluation.  Foreign Schuler MD. Neurosurgeon. [facility].

## 2022-10-07 NOTE — PROGRESS NOTES
0047 - Updated MD with pts SBP between 155-160. Received paramaters for SBP < 160. See MAR for new PRN  orders.

## 2022-10-07 NOTE — ED NOTES
Pt assisted with ambulation to the RR. No other needs at the current time. Call light within reach.
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
## 2022-10-07 NOTE — THERAPY
Missed Therapy     Patient Name: Vin Mckoy  Age:  62 y.o., Sex:  male  Medical Record #: [id number]  Today's Date: 10/7/2022    Discussed missed therapy with RN       10/07/22 1631   Interdisciplinary Plan of Care Collaboration   IDT Collaboration with  Nursing   Collaboration Comments Attempted cog eval this date, however attempt 1 patient was with ultrasound and attempt 2 patient with transfer orders to the floor. Will hold and complete order as appropriate.
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
## 2022-10-07 NOTE — ED NOTES
Med rec partially complete per patient  Allergies reviewed    Patient was on a course of Bactrim that he completed about 2 weeks ago, unsure of frequency/length of course

## 2022-10-08 VITALS
HEART RATE: 70 BPM | RESPIRATION RATE: 18 BRPM | TEMPERATURE: 97.7 F | SYSTOLIC BLOOD PRESSURE: 154 MMHG | DIASTOLIC BLOOD PRESSURE: 91 MMHG | HEIGHT: 67 IN | BODY MASS INDEX: 14.36 KG/M2 | WEIGHT: 91.49 LBS | OXYGEN SATURATION: 98 %

## 2022-10-08 LAB
ALBUMIN SERPL BCP-MCNC: 4.2 G/DL (ref 3.2–4.9)
ALBUMIN/GLOB SERPL: 1.5 G/DL
ALP SERPL-CCNC: 76 U/L (ref 30–99)
ALT SERPL-CCNC: 14 U/L (ref 2–50)
ANION GAP SERPL CALC-SCNC: 8 MMOL/L (ref 7–16)
AST SERPL-CCNC: 13 U/L (ref 12–45)
BASOPHILS # BLD AUTO: 0.8 % (ref 0–1.8)
BASOPHILS # BLD: 0.07 K/UL (ref 0–0.12)
BILIRUB SERPL-MCNC: 0.3 MG/DL (ref 0.1–1.5)
BUN SERPL-MCNC: 13 MG/DL (ref 8–22)
CALCIUM SERPL-MCNC: 8.9 MG/DL (ref 8.5–10.5)
CHLORIDE SERPL-SCNC: 108 MMOL/L (ref 96–112)
CO2 SERPL-SCNC: 23 MMOL/L (ref 20–33)
CREAT SERPL-MCNC: 0.65 MG/DL (ref 0.5–1.4)
EOSINOPHIL # BLD AUTO: 0.2 K/UL (ref 0–0.51)
EOSINOPHIL NFR BLD: 2.2 % (ref 0–6.9)
ERYTHROCYTE [DISTWIDTH] IN BLOOD BY AUTOMATED COUNT: 49.2 FL (ref 35.9–50)
GFR SERPLBLD CREATININE-BSD FMLA CKD-EPI: 108 ML/MIN/1.73 M 2
GLOBULIN SER CALC-MCNC: 2.8 G/DL (ref 1.9–3.5)
GLUCOSE SERPL-MCNC: 54 MG/DL (ref 65–99)
HCT VFR BLD AUTO: 46.1 % (ref 42–52)
HGB BLD-MCNC: 15.6 G/DL (ref 14–18)
IMM GRANULOCYTES # BLD AUTO: 0.02 K/UL (ref 0–0.11)
IMM GRANULOCYTES NFR BLD AUTO: 0.2 % (ref 0–0.9)
LYMPHOCYTES # BLD AUTO: 2.24 K/UL (ref 1–4.8)
LYMPHOCYTES NFR BLD: 24.3 % (ref 22–41)
MCH RBC QN AUTO: 30.8 PG (ref 27–33)
MCHC RBC AUTO-ENTMCNC: 33.8 G/DL (ref 33.7–35.3)
MCV RBC AUTO: 90.9 FL (ref 81.4–97.8)
MONOCYTES # BLD AUTO: 0.65 K/UL (ref 0–0.85)
MONOCYTES NFR BLD AUTO: 7 % (ref 0–13.4)
NEUTROPHILS # BLD AUTO: 6.05 K/UL (ref 1.82–7.42)
NEUTROPHILS NFR BLD: 65.5 % (ref 44–72)
NRBC # BLD AUTO: 0 K/UL
NRBC BLD-RTO: 0 /100 WBC
PLATELET # BLD AUTO: 284 K/UL (ref 164–446)
PMV BLD AUTO: 11.6 FL (ref 9–12.9)
POTASSIUM SERPL-SCNC: 4.6 MMOL/L (ref 3.6–5.5)
PROT SERPL-MCNC: 7 G/DL (ref 6–8.2)
RBC # BLD AUTO: 5.07 M/UL (ref 4.7–6.1)
SODIUM SERPL-SCNC: 139 MMOL/L (ref 135–145)
WBC # BLD AUTO: 9.2 K/UL (ref 4.8–10.8)

## 2022-10-08 PROCEDURE — 700111 HCHG RX REV CODE 636 W/ 250 OVERRIDE (IP): Performed by: SURGERY

## 2022-10-08 PROCEDURE — 700102 HCHG RX REV CODE 250 W/ 637 OVERRIDE(OP): Performed by: SURGERY

## 2022-10-08 PROCEDURE — A9270 NON-COVERED ITEM OR SERVICE: HCPCS | Performed by: NURSE PRACTITIONER

## 2022-10-08 PROCEDURE — 36415 COLL VENOUS BLD VENIPUNCTURE: CPT

## 2022-10-08 PROCEDURE — 80053 COMPREHEN METABOLIC PANEL: CPT

## 2022-10-08 PROCEDURE — 92523 SPEECH SOUND LANG COMPREHEN: CPT

## 2022-10-08 PROCEDURE — 90686 IIV4 VACC NO PRSV 0.5 ML IM: CPT | Performed by: SURGERY

## 2022-10-08 PROCEDURE — 85025 COMPLETE CBC W/AUTO DIFF WBC: CPT

## 2022-10-08 PROCEDURE — A9270 NON-COVERED ITEM OR SERVICE: HCPCS | Performed by: SURGERY

## 2022-10-08 PROCEDURE — 700102 HCHG RX REV CODE 250 W/ 637 OVERRIDE(OP): Performed by: NURSE PRACTITIONER

## 2022-10-08 RX ORDER — ACETAMINOPHEN 325 MG/1
650 TABLET ORAL EVERY 6 HOURS PRN
Qty: 30 TABLET | Refills: 0 | COMMUNITY
Start: 2022-10-12

## 2022-10-08 RX ORDER — BUTALBITAL, ACETAMINOPHEN AND CAFFEINE 50; 325; 40 MG/1; MG/1; MG/1
1 TABLET ORAL EVERY 6 HOURS PRN
Qty: 20 TABLET | Refills: 0 | Status: SHIPPED | OUTPATIENT
Start: 2022-10-08 | End: 2022-10-13

## 2022-10-08 RX ADMIN — ACETAMINOPHEN 650 MG: 325 TABLET, FILM COATED ORAL at 00:12

## 2022-10-08 RX ADMIN — ACETAMINOPHEN 650 MG: 325 TABLET, FILM COATED ORAL at 05:14

## 2022-10-08 RX ADMIN — PREGABALIN 300 MG: 150 CAPSULE ORAL at 05:14

## 2022-10-08 RX ADMIN — DOCUSATE SODIUM 100 MG: 100 CAPSULE, LIQUID FILLED ORAL at 05:15

## 2022-10-08 RX ADMIN — INFLUENZA A VIRUS A/VICTORIA/2570/2019 IVR-215 (H1N1) ANTIGEN (FORMALDEHYDE INACTIVATED), INFLUENZA A VIRUS A/DARWIN/9/2021 SAN-010 (H3N2) ANTIGEN (FORMALDEHYDE INACTIVATED), INFLUENZA B VIRUS B/PHUKET/3073/2013 ANTIGEN (FORMALDEHYDE INACTIVATED), AND INFLUENZA B VIRUS B/MICHIGAN/01/2021 ANTIGEN (FORMALDEHYDE INACTIVATED) 0.5 ML: 15; 15; 15; 15 INJECTION, SUSPENSION INTRAMUSCULAR at 06:27

## 2022-10-08 RX ADMIN — MAGNESIUM HYDROXIDE 30 ML: 400 SUSPENSION ORAL at 05:15

## 2022-10-08 RX ADMIN — ACETAMINOPHEN 650 MG: 325 TABLET, FILM COATED ORAL at 12:12

## 2022-10-08 RX ADMIN — POLYETHYLENE GLYCOL 3350 1 PACKET: 17 POWDER, FOR SOLUTION ORAL at 05:15

## 2022-10-08 ASSESSMENT — ENCOUNTER SYMPTOMS
RESPIRATORY NEGATIVE: 1
PSYCHIATRIC NEGATIVE: 1
MYALGIAS: 1
HEADACHES: 1

## 2022-10-08 ASSESSMENT — PAIN DESCRIPTION - PAIN TYPE
TYPE: ACUTE PAIN

## 2022-10-08 NOTE — DISCHARGE INSTRUCTIONS
- Call or seek medical attention for questions or concerns  - Follow up with the [facility] RETURN: PRN  - Follow up with Dr. Foreign Schuler in 1-2 weeks time, avoid all blood thinners including aspirin or NSAIDs (ibuprophen, Advil, Aleve, Motrin) for at least two weeks  - Follow up with primary care provider within one weeks time  - Resume regular diet  - May take over the counter acetaminophen as needed for pain  - Continue daily over the counter stool softener while on narcotics  - No operation of machinery or motorized vehicles while under the influence of narcotics  - No alcohol, marijuana or illicit drug use while under the influence of narcotics  - In the event of a narcotic overdose naloxone (Narcan) is available without a prescription from any [facility] or [facility]  - No swimming, hot tubs, baths or wound submersion until cleared by outpatient provider. May shower  - No contact sports, strenuous activities, or heavy lifting until cleared by outpatient provider  - If respiratory decompensation, persistent or worsening pain, neurological decompensation, or signs or symptoms of infection occur seek medical attention    Discharge Instructions    Discharged to home by car with relative. Discharged via wheelchair, hospital escort: Yes.  Special equipment needed: Cane    Be sure to schedule a follow-up appointment with your primary care doctor or any specialists as instructed.     Discharge Plan:        I understand that a diet low in cholesterol, fat, and sodium is recommended for good health. Unless I have been given specific instructions below for another diet, I accept this instruction as my diet prescription.   Other diet: regular    Special Instructions: None    -Is this patient being discharged with medication to prevent blood clots?  No    Is patient discharged on Warfarin / Coumadin?   No

## 2022-10-08 NOTE — CARE PLAN
The patient is Stable - Low risk of patient condition declining or worsening    Shift Goals  Clinical Goals: No complications related to neuro status; Pain management;  Patient Goals: Sleep comfortably; Eat food    Progress made toward(s) clinical / shift goals:  Patient remains alert and oriented. Neurological assessments continued, as ordered. No further neurological complications have been observed. Patient continues routine pharmacological pain management, as ordered. No signs of distress or discomfort have been observed. Will continue to monitor.       Problem: Knowledge Deficit - Standard  Goal: Patient and family/care givers will demonstrate understanding of plan of care, disease process/condition, diagnostic tests and medications  Outcome: Progressing     Problem: Pain - Standard  Goal: Alleviation of pain or a reduction in pain to the patient’s comfort goal  Outcome: Progressing     Problem: Psychosocial  Goal: Patient's level of anxiety will decrease  Outcome: Progressing  Goal: Patient's ability to verbalize feelings about condition will improve  Outcome: Progressing  Goal: Patient's ability to re-evaluate and adapt role responsibilities will improve  Outcome: Progressing  Goal: Patient and family will demonstrate ability to cope with life altering diagnosis and/or procedure  Outcome: Progressing  Goal: Spiritual and cultural needs incorporated into hospitalization  Outcome: Progressing     Problem: Hemodynamics  Goal: Patient's hemodynamics, fluid balance and neurologic status will be stable or improve  Outcome: Progressing     Problem: Respiratory  Goal: Patient will achieve/maintain optimum respiratory ventilation and gas exchange  Outcome: Progressing     Problem: Risk for Aspiration  Goal: Patient's risk for aspiration will be absent or decrease  Outcome: Progressing     Problem: Urinary - Renal Perfusion  Goal: Ability to achieve and maintain adequate renal perfusion and functioning will  improve  Outcome: Progressing     Problem: Venous Thromboembolism (VTE) Prevention  Goal: The patient will remain free from venous thromboembolism (VTE)  Outcome: Progressing     Problem: Nutrition  Goal: Patient's nutritional and fluid intake will be adequate or improve  Outcome: Progressing  Goal: Enteral nutrition will be maintained or improve  Outcome: Progressing  Goal: Enteral nutrition will be maintained or improve  Outcome: Progressing     Problem: Urinary Elimination  Goal: Establish and maintain regular urinary output  Outcome: Progressing     Problem: Bowel Elimination  Goal: Establish and maintain regular bowel function  Outcome: Progressing     Problem: Fall Risk  Goal: Patient will remain free from falls  Outcome: Progressing
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
## 2022-10-08 NOTE — PROGRESS NOTES
Report received from THADDEUS Coffman regarding patient status and course of stay. Patient transferred to [facility].    Patient received on the unit and assessed. No signs of distress or discomfort are observed. No complications related to neuro status are observed. Will continue to monitor.
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
## 2022-11-03 NOTE — TELEPHONE ENCOUNTER
The problem is why he is having so many seizures.  When he was last seen just 2 months ago, things seem to be fairly stable.  He needs to be seen, some blood work drawn, all before we make any adjustments in medications.  I will see if Chapis Sherwood is willing to see him on an urgent basis, I will put some lab work in.   Statement Selected